# Patient Record
(demographics unavailable — no encounter records)

---

## 2024-12-17 NOTE — HEALTH RISK ASSESSMENT
[0] : 2) Feeling down, depressed, or hopeless: Not at all (0) [PHQ-2 Negative - No further assessment needed] : PHQ-2 Negative - No further assessment needed [Never] : Never [Patient reported PAP Smear was normal] : Patient reported PAP Smear was normal [With Significant Other] : lives with significant other [Employed] : employed [Significant Other] : lives with significant other [# Of Children ___] : has [unfilled] children [Sexually Active] : sexually active [Feels Safe at Home] : Feels safe at home [No] : In the past 12 months have you used drugs other than those required for medical reasons? No [de-identified] : sober for 2 yrs  [[id number]] : 0 [Reports changes in hearing] : Reports no changes in hearing [Reports changes in vision] : Reports no changes in vision [PapSmearDate] : 10/23 [HIVDate] : 01/23 [HIVComments] : neg [HepatitisCDate] : 01/23 [HepatitisCComments] : neg

## 2024-12-17 NOTE — PHYSICAL EXAM
[No Acute Distress] : no acute distress [Well Nourished] : well nourished [Well Developed] : well developed [Normal Sclera/Conjunctiva] : normal sclera/conjunctiva [Normal Outer Ear/Nose] : the outer ears and nose were normal in appearance [No Respiratory Distress] : no respiratory distress  [Clear to Auscultation] : lungs were clear to auscultation bilaterally [Normal Rate] : normal rate  [Regular Rhythm] : with a regular rhythm [Normal S1, S2] : normal S1 and S2 [No Edema] : there was no peripheral edema [Soft] : abdomen soft [Non Tender] : non-tender [Non-distended] : non-distended [Normal Affect] : the affect was normal [Alert and Oriented x3] : oriented to person, place, and time [Normal Mood] : the mood was normal [de-identified] : no thyroid enlargement  [de-identified] : Systolic murmur [de-identified] : no breast lumps or nipple discharge on examination, chaperoned by Dr. Herrera

## 2024-12-17 NOTE — ASSESSMENT
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   #HCM  - Check A1C, CBC, CMP, Lipid profile, TSH/free T4  - pt declined influenza vaccine due to bad experience with it in the past  - UTD with TDAP (10/2020) and cervical cancer screening (10/2023 w/ HPV co-testing - neg)  - check [employer]ifer[employer] TB for work clearance (will fill out the work clearance form and call pt to / or confirm email to send the form once lab resulted)   #Anxiety/Depression  -PHQ-2 neg  - pt reports good spirit, mood well controlled w/o meds   #Substance Use Disorder  - pt did not use alcohol or cocaine for 2 yrs  - positive encouragement reinforced and pt was advised to seek medical attention if having difficulty maintaining sober   #Intraductal sclerosing papilloma s/p Left radha loc Exc bx 2023 - last left breast ultrasound from 02/2024 - No sonographic evidence of malignancy left breast. Left breast 12:00 intradermal mass, possible sebaceous cyst/epidermal inclusion cyst. 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 Javier Fonseca ([place]) ,PGY-1

## 2024-12-17 NOTE — HISTORY OF PRESENT ILLNESS
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 Pt reports feeling well, denied any complaints such as CP, SOB, n/v/abd pain, HA and dizziness. She didn't notice any lumps or breast discharge since her breast lump removal in July 2023. Pt wants cardiology referral as she had heart murmur from birth and was told to follow up with cardiologist after the breast surgery.   Meds: Multivitamins, depo shot (started Oct 2023, next dose due in January) PSH: appendectomy in 2008, removal of breast lump in July 2023 Allergy: allergic to PCN - rashes SH: never smoker, haven't used alcohol and cocaine for 2 yrs; lives with 4 kids and boyfriend at home, in monogamous relationship FH: HTN, alcohol use disorder, T2DM in father

## 2024-12-17 NOTE — HEALTH RISK ASSESSMENT
[0] : 2) Feeling down, depressed, or hopeless: Not at all (0) [PHQ-2 Negative - No further assessment needed] : PHQ-2 Negative - No further assessment needed [Never] : Never [Patient reported PAP Smear was normal] : Patient reported PAP Smear was normal [With Significant Other] : lives with significant other [Employed] : employed [Significant Other] : lives with significant other [# Of Children ___] : has [unfilled] children [Sexually Active] : sexually active [Feels Safe at Home] : Feels safe at home [No] : In the past 12 months have you used drugs other than those required for medical reasons? No [de-identified] : sober for 2 yrs  [[id number]] : 0 [Reports changes in hearing] : Reports no changes in hearing [Reports changes in vision] : Reports no changes in vision [PapSmearDate] : 10/23 [HIVDate] : 01/23 [HIVComments] : neg [HepatitisCDate] : 01/23 [HepatitisCComments] : neg

## 2024-12-17 NOTE — HISTORY OF PRESENT ILLNESS
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 Pt reports feeling well, denied any complaints such as CP, SOB, n/v/abd pain, HA and dizziness. She didn't notice any lumps or breast discharge since her breast lump removal in July 2023. Pt wants cardiology referral as she had heart murmur from birth and was told to follow up with cardiologist after the breast surgery.   Meds: Multivitamins, depo shot (started Oct 2023, next dose due in January) PSH: appendectomy in 2008, removal of breast lump in July 2023 Allergy: allergic to PCN - rashes SH: never smoker, haven't used alcohol and cocaine for 2 yrs; lives with 4 kids and boyfriend at home, in monogamous relationship FH: HTN, alcohol use disorder, T2DM in father

## 2024-12-17 NOTE — PHYSICAL EXAM
[No Acute Distress] : no acute distress [Well Nourished] : well nourished [Well Developed] : well developed [Normal Sclera/Conjunctiva] : normal sclera/conjunctiva [Normal Outer Ear/Nose] : the outer ears and nose were normal in appearance [No Respiratory Distress] : no respiratory distress  [Clear to Auscultation] : lungs were clear to auscultation bilaterally [Normal Rate] : normal rate  [Regular Rhythm] : with a regular rhythm [Normal S1, S2] : normal S1 and S2 [No Edema] : there was no peripheral edema [Soft] : abdomen soft [Non Tender] : non-tender [Non-distended] : non-distended [Normal Affect] : the affect was normal [Alert and Oriented x3] : oriented to person, place, and time [Normal Mood] : the mood was normal [de-identified] : no thyroid enlargement  [de-identified] : Systolic murmur [de-identified] : no breast lumps or nipple discharge on examination, chaperoned by Dr. Herrear

## 2024-12-17 NOTE — REVIEW OF SYSTEMS
[Fever] : no fever [Chills] : no chills [Fatigue] : no fatigue [Vision Problems] : no vision problems [Hearing Loss] : no hearing loss [Chest Pain] : no chest pain [Palpitations] : no palpitations [Lower Ext Edema] : no lower extremity edema [Shortness Of Breath] : no shortness of breath [Cough] : no cough [Abdominal Pain] : no abdominal pain [Nausea] : no nausea [Vomiting] : no vomiting [Headache] : no headache [Dizziness] : no dizziness [Anxiety] : no anxiety [Depression] : no depression